# Patient Record
Sex: MALE | Race: WHITE | NOT HISPANIC OR LATINO | Employment: FULL TIME | URBAN - METROPOLITAN AREA
[De-identification: names, ages, dates, MRNs, and addresses within clinical notes are randomized per-mention and may not be internally consistent; named-entity substitution may affect disease eponyms.]

---

## 2021-03-29 ENCOUNTER — IMMUNIZATION (OUTPATIENT)
Dept: PRIMARY CARE CLINIC | Facility: CLINIC | Age: 51
End: 2021-03-29
Payer: COMMERCIAL

## 2021-03-29 DIAGNOSIS — Z23 NEED FOR VACCINATION: Primary | ICD-10-CM

## 2021-03-29 PROCEDURE — 91301 PR SARS-COV-2 COVID-19 VACCINE, NO PRSV, 100MCG/0.5ML, IM: ICD-10-PCS | Mod: S$GLB,,, | Performed by: INTERNAL MEDICINE

## 2021-03-29 PROCEDURE — 91301 PR SARS-COV-2 COVID-19 VACCINE, NO PRSV, 100MCG/0.5ML, IM: CPT | Mod: S$GLB,,, | Performed by: INTERNAL MEDICINE

## 2021-03-29 PROCEDURE — 0011A PR IMMUNIZ ADMIN, SARS-COV-2 COVID-19 VACC, 100MCG/0.5ML, 1ST DOSE: ICD-10-PCS | Mod: CV19,S$GLB,, | Performed by: INTERNAL MEDICINE

## 2021-03-29 PROCEDURE — 0011A PR IMMUNIZ ADMIN, SARS-COV-2 COVID-19 VACC, 100MCG/0.5ML, 1ST DOSE: CPT | Mod: CV19,S$GLB,, | Performed by: INTERNAL MEDICINE

## 2021-03-29 RX ADMIN — Medication 0.5 ML: at 08:03

## 2021-04-28 ENCOUNTER — IMMUNIZATION (OUTPATIENT)
Dept: PRIMARY CARE CLINIC | Facility: CLINIC | Age: 51
End: 2021-04-28
Payer: COMMERCIAL

## 2021-04-28 DIAGNOSIS — Z23 NEED FOR VACCINATION: Primary | ICD-10-CM

## 2021-04-28 PROCEDURE — 0012A PR IMMUNIZ ADMIN, SARS-COV-2 COVID-19 VACC, 100MCG/0.5ML, 2ND DOSE: CPT | Mod: CV19,S$GLB,, | Performed by: INTERNAL MEDICINE

## 2021-04-28 PROCEDURE — 91301 PR SARS-COV-2 COVID-19 VACCINE, NO PRSV, 100MCG/0.5ML, IM: ICD-10-PCS | Mod: S$GLB,,, | Performed by: INTERNAL MEDICINE

## 2021-04-28 PROCEDURE — 0012A PR IMMUNIZ ADMIN, SARS-COV-2 COVID-19 VACC, 100MCG/0.5ML, 2ND DOSE: ICD-10-PCS | Mod: CV19,S$GLB,, | Performed by: INTERNAL MEDICINE

## 2021-04-28 PROCEDURE — 91301 PR SARS-COV-2 COVID-19 VACCINE, NO PRSV, 100MCG/0.5ML, IM: CPT | Mod: S$GLB,,, | Performed by: INTERNAL MEDICINE

## 2021-04-28 RX ADMIN — Medication 0.5 ML: at 08:04

## 2021-12-22 ENCOUNTER — PATIENT MESSAGE (OUTPATIENT)
Dept: ADMINISTRATIVE | Facility: OTHER | Age: 51
End: 2021-12-22
Payer: COMMERCIAL

## 2024-11-12 ENCOUNTER — LAB VISIT (OUTPATIENT)
Dept: LAB | Facility: OTHER | Age: 54
End: 2024-11-12
Attending: STUDENT IN AN ORGANIZED HEALTH CARE EDUCATION/TRAINING PROGRAM
Payer: COMMERCIAL

## 2024-11-12 ENCOUNTER — OFFICE VISIT (OUTPATIENT)
Dept: INTERNAL MEDICINE | Facility: CLINIC | Age: 54
End: 2024-11-12
Payer: COMMERCIAL

## 2024-11-12 VITALS
SYSTOLIC BLOOD PRESSURE: 144 MMHG | OXYGEN SATURATION: 98 % | HEART RATE: 61 BPM | HEIGHT: 69 IN | BODY MASS INDEX: 28.47 KG/M2 | DIASTOLIC BLOOD PRESSURE: 81 MMHG | WEIGHT: 192.25 LBS

## 2024-11-12 DIAGNOSIS — I10 BENIGN ESSENTIAL HTN: Primary | ICD-10-CM

## 2024-11-12 DIAGNOSIS — Z12.11 SCREENING FOR COLON CANCER: ICD-10-CM

## 2024-11-12 DIAGNOSIS — Z12.5 PROSTATE CANCER SCREENING: ICD-10-CM

## 2024-11-12 DIAGNOSIS — Z00.00 ENCOUNTER FOR SCREENING AND PREVENTATIVE CARE: ICD-10-CM

## 2024-11-12 DIAGNOSIS — Z00.00 ANNUAL PHYSICAL EXAM: ICD-10-CM

## 2024-11-12 DIAGNOSIS — I10 BENIGN ESSENTIAL HTN: ICD-10-CM

## 2024-11-12 LAB
ALBUMIN SERPL BCP-MCNC: 4 G/DL (ref 3.5–5.2)
ALP SERPL-CCNC: 63 U/L (ref 40–150)
ALT SERPL W/O P-5'-P-CCNC: 23 U/L (ref 10–44)
ANION GAP SERPL CALC-SCNC: 8 MMOL/L (ref 8–16)
AST SERPL-CCNC: 22 U/L (ref 10–40)
BILIRUB SERPL-MCNC: 0.6 MG/DL (ref 0.1–1)
BUN SERPL-MCNC: 11 MG/DL (ref 6–20)
CALCIUM SERPL-MCNC: 9.8 MG/DL (ref 8.7–10.5)
CHLORIDE SERPL-SCNC: 108 MMOL/L (ref 95–110)
CHOLEST SERPL-MCNC: 178 MG/DL (ref 120–199)
CHOLEST/HDLC SERPL: 3 {RATIO} (ref 2–5)
CO2 SERPL-SCNC: 26 MMOL/L (ref 23–29)
COMPLEXED PSA SERPL-MCNC: 1 NG/ML (ref 0–4)
CREAT SERPL-MCNC: 1.1 MG/DL (ref 0.5–1.4)
ERYTHROCYTE [DISTWIDTH] IN BLOOD BY AUTOMATED COUNT: 14.1 % (ref 11.5–14.5)
EST. GFR  (NO RACE VARIABLE): >60 ML/MIN/1.73 M^2
ESTIMATED AVG GLUCOSE: 100 MG/DL (ref 68–131)
GLUCOSE SERPL-MCNC: 103 MG/DL (ref 70–110)
HBA1C MFR BLD: 5.1 % (ref 4–5.6)
HCT VFR BLD AUTO: 43.4 % (ref 40–54)
HCV AB SERPL QL IA: NEGATIVE
HDLC SERPL-MCNC: 59 MG/DL (ref 40–75)
HDLC SERPL: 33.1 % (ref 20–50)
HGB BLD-MCNC: 14.5 G/DL (ref 14–18)
HIV 1+2 AB+HIV1 P24 AG SERPL QL IA: NEGATIVE
LDLC SERPL CALC-MCNC: 91.8 MG/DL (ref 63–159)
MCH RBC QN AUTO: 29.8 PG (ref 27–31)
MCHC RBC AUTO-ENTMCNC: 33.4 G/DL (ref 32–36)
MCV RBC AUTO: 89 FL (ref 82–98)
NONHDLC SERPL-MCNC: 119 MG/DL
PLATELET # BLD AUTO: 280 K/UL (ref 150–450)
PMV BLD AUTO: 10.5 FL (ref 9.2–12.9)
POTASSIUM SERPL-SCNC: 4.8 MMOL/L (ref 3.5–5.1)
PROT SERPL-MCNC: 6.9 G/DL (ref 6–8.4)
RBC # BLD AUTO: 4.87 M/UL (ref 4.6–6.2)
SODIUM SERPL-SCNC: 142 MMOL/L (ref 136–145)
TRIGL SERPL-MCNC: 136 MG/DL (ref 30–150)
WBC # BLD AUTO: 7.56 K/UL (ref 3.9–12.7)

## 2024-11-12 PROCEDURE — 83036 HEMOGLOBIN GLYCOSYLATED A1C: CPT | Performed by: STUDENT IN AN ORGANIZED HEALTH CARE EDUCATION/TRAINING PROGRAM

## 2024-11-12 PROCEDURE — 4010F ACE/ARB THERAPY RXD/TAKEN: CPT | Mod: CPTII,S$GLB,, | Performed by: STUDENT IN AN ORGANIZED HEALTH CARE EDUCATION/TRAINING PROGRAM

## 2024-11-12 PROCEDURE — 86803 HEPATITIS C AB TEST: CPT | Performed by: STUDENT IN AN ORGANIZED HEALTH CARE EDUCATION/TRAINING PROGRAM

## 2024-11-12 PROCEDURE — 99386 PREV VISIT NEW AGE 40-64: CPT | Mod: S$GLB,,, | Performed by: STUDENT IN AN ORGANIZED HEALTH CARE EDUCATION/TRAINING PROGRAM

## 2024-11-12 PROCEDURE — 80061 LIPID PANEL: CPT | Performed by: STUDENT IN AN ORGANIZED HEALTH CARE EDUCATION/TRAINING PROGRAM

## 2024-11-12 PROCEDURE — 3077F SYST BP >= 140 MM HG: CPT | Mod: CPTII,S$GLB,, | Performed by: STUDENT IN AN ORGANIZED HEALTH CARE EDUCATION/TRAINING PROGRAM

## 2024-11-12 PROCEDURE — 80053 COMPREHEN METABOLIC PANEL: CPT | Performed by: STUDENT IN AN ORGANIZED HEALTH CARE EDUCATION/TRAINING PROGRAM

## 2024-11-12 PROCEDURE — 84153 ASSAY OF PSA TOTAL: CPT | Performed by: STUDENT IN AN ORGANIZED HEALTH CARE EDUCATION/TRAINING PROGRAM

## 2024-11-12 PROCEDURE — 1159F MED LIST DOCD IN RCRD: CPT | Mod: CPTII,S$GLB,, | Performed by: STUDENT IN AN ORGANIZED HEALTH CARE EDUCATION/TRAINING PROGRAM

## 2024-11-12 PROCEDURE — 3008F BODY MASS INDEX DOCD: CPT | Mod: CPTII,S$GLB,, | Performed by: STUDENT IN AN ORGANIZED HEALTH CARE EDUCATION/TRAINING PROGRAM

## 2024-11-12 PROCEDURE — 85027 COMPLETE CBC AUTOMATED: CPT | Performed by: STUDENT IN AN ORGANIZED HEALTH CARE EDUCATION/TRAINING PROGRAM

## 2024-11-12 PROCEDURE — 36415 COLL VENOUS BLD VENIPUNCTURE: CPT | Performed by: STUDENT IN AN ORGANIZED HEALTH CARE EDUCATION/TRAINING PROGRAM

## 2024-11-12 PROCEDURE — 87389 HIV-1 AG W/HIV-1&-2 AB AG IA: CPT | Performed by: STUDENT IN AN ORGANIZED HEALTH CARE EDUCATION/TRAINING PROGRAM

## 2024-11-12 PROCEDURE — 99999 PR PBB SHADOW E&M-EST. PATIENT-LVL III: CPT | Mod: PBBFAC,,, | Performed by: STUDENT IN AN ORGANIZED HEALTH CARE EDUCATION/TRAINING PROGRAM

## 2024-11-12 PROCEDURE — 3044F HG A1C LEVEL LT 7.0%: CPT | Mod: CPTII,S$GLB,, | Performed by: STUDENT IN AN ORGANIZED HEALTH CARE EDUCATION/TRAINING PROGRAM

## 2024-11-12 PROCEDURE — 3079F DIAST BP 80-89 MM HG: CPT | Mod: CPTII,S$GLB,, | Performed by: STUDENT IN AN ORGANIZED HEALTH CARE EDUCATION/TRAINING PROGRAM

## 2024-11-12 RX ORDER — FINASTERIDE 1 MG/1
1 TABLET, FILM COATED ORAL
COMMUNITY
Start: 2024-10-18

## 2024-11-12 RX ORDER — LOSARTAN POTASSIUM 25 MG/1
TABLET ORAL
COMMUNITY
Start: 2023-11-11 | End: 2024-11-12 | Stop reason: SDUPTHER

## 2024-11-12 RX ORDER — MINOXIDIL 2.5 MG/1
2.5 TABLET ORAL
COMMUNITY
Start: 2024-10-18

## 2024-11-12 RX ORDER — LOSARTAN POTASSIUM 25 MG/1
25 TABLET ORAL DAILY
Qty: 90 TABLET | Refills: 3 | Status: SHIPPED | OUTPATIENT
Start: 2024-11-12 | End: 2025-11-07

## 2024-11-12 NOTE — PROGRESS NOTES
[facility]  Subjective:     Patient ID: Ricardo Guo is a 54 y.o. male.  History of Present Illness    CHIEF COMPLAINT:  Patient presents to establish care with a new PCP and obtain a refill for his high blood pressure medication.    HPI:  Patient, a 54-year-old male, seeks to establish care with a new PCP after previously a PCP on the [facility]. He ran out of his high blood pressure medication yesterday and needs a refill. He is compliant with losartan 25 mg daily, reporting his blood pressure usually runs 120/80 when medicated. Patient does not routinely measure his blood pressure at home, only doing so on rare occasions when he has mild discomfort.    He exercises regularly, running 3 days a week and lifting weights 3 days a week, totaling 6 days of exercise per week. He works as a , predominantly a desk job. Patient reports occasional alcohol use, consuming approximately 2 drinks per week on weekends. He is currently sexually active with a female partner.    Patient mentions having right knee surgery 5 years ago but does not report ongoing knee issues or symptoms. He denies pain, discomfort, shortness of breath, smoking, and drug use.    MEDICATIONS:  Patient is on Losartan 25 mg, taking 1 tablet daily for high blood pressure. He is also on Finasteride and Minoxidil.    MEDICAL HISTORY:  Patient has a history of hypertension.    FAMILY HISTORY:  Family history is significant for mother with a history of stomach cancer. His father has a history of prostate cancer, and his brother has a history of high blood pressure.    SURGICAL HISTORY:  Patient underwent right knee surgery 5 years ago.    TEST RESULTS:  Patient had a cholesterol panel last year, which was slightly elevated but within acceptable range. His complete blood count, kidney function tests, liver function tests, and PSA were all normal last year.    ALLERGIES:  Patient reports no allergies.    SOCIAL HISTORY:  Patient  "consumes alcohol occasionally, roughly 2 drinks a week on weekends. He is a nonsmoker and denies any drug use. Patient works as a , specializing in insurance defense. He is single.       Current Outpatient Medications   Medication Instructions    finasteride (PROPECIA) 1 mg    losartan (COZAAR) 25 mg, Oral, Daily    minoxidiL (LONITEN) 2.5 mg     Objective:      Body mass index is 28.39 kg/m².  Vitals:    11/12/24 0842   BP: (!) 144/81   Pulse: 61   SpO2: 98%   Weight: 87.2 kg (192 lb 3.9 oz)   Height: 5' 9" (1.753 m)   PainSc: 0-No pain     Physical Exam   Physical Exam    General: No acute distress. Normal appearance.  Head: Normocephalic.  Eyes: Extraocular movements intact.  Cardiovascular: Normal rate and rhythm. No murmur heard.  Lungs: Pulmonary effort is normal. Normal breath sounds. No wheezing. No rales.  Abdomen: Flat.  Musculoskeletal: No edema lower extremities.  Skin: Warm. Dry.  Neurological: Alert.  Psychiatric: Normal mood. Normal behavior.  Vitals: Blood pressure: 144/81. Blood pressure: 137/80.       Assessment:       1. Benign essential HTN    2. Screening for colon cancer    3. Encounter for screening and preventative care    4. Prostate cancer screening        Plan:   Assessment & Plan    PLAN SUMMARY:  Continue losartan 25mg daily for hypertension  Cologuard test ordered for colon cancer screening  Shingles vaccine to be administered at pharmacy  Routine labs ordered including HIV, hepatitis C, A1C, lipid panel, PSA, kidney and liver function tests  Follow up in 1 year if doing well  Check portal for routine result messages    HYPERTENSION:  Assessed hypertension control with low-dose losartan 25mg daily.  Continued losartan 25mg daily for hypertension.    COLON CANCER SCREENING:  Evaluated need for routine health screenings including colon cancer, prostate cancer, and vaccinations.  Considered patient's preference for non-invasive colon cancer screening.  Explained Cologuard as a " non-invasive alternative to colonoscopy for colon cancer screening.  Cologuard test ordered for colon cancer screening.    PROSTATE CANCER SCREENING:  Discussed risks and benefits of PSA testing given family history of prostate cancer.  Discussed the pros and cons of PSA testing for prostate cancer screening.    HYPERLIPIDEMIA:  Reviewed recent lab results, noting slightly elevated cholesterol but otherwise normal values.    SHINGLES VACCINATION:  Provided information on shingles vaccine, including potential side effects and its effectiveness.  Patient to get shingles vaccine at pharmacy (2 doses, 2 months apart).    LABS:  Routine labs ordered including HIV screening, hepatitis C screening, A1C, lipid panel, PSA, kidney and liver function tests.    FOLLOW UP:  Follow up in 1 year if doing well.  Contact the office if anything concerning arises before next scheduled visit.  Check portal for routine result messages.         Benign essential HTN  -     losartan (COZAAR) 25 MG tablet; Take 1 tablet (25 mg total) by mouth once daily.  Dispense: 90 tablet; Refill: 3  -     COMPREHENSIVE METABOLIC PANEL; Future; Expected date: 11/12/2024  -     CBC Without Differential; Future; Expected date: 11/12/2024    Screening for colon cancer  -     Cologuard Screening (Multitarget Stool DNA); Future; Expected date: 11/12/2024    Encounter for screening and preventative care  -     Hepatitis C antibody; Future; Expected date: 11/12/2024  -     HIV 1/2 Ag/Ab (4th Gen); Future; Expected date: 11/12/2024  -     Hemoglobin A1c; Future; Expected date: 11/12/2024    Prostate cancer screening  -     PSA, SCREENING; Future; Expected date: 11/12/2024        Tests to Keep You Healthy    Colon Cancer Screening: ORDERED  Last Blood Pressure <= 139/89 (11/12/2024): NO    Follow up in about 1 year (around 11/12/2025). or sooner prn (as needed)          Eitan Menjivar  [facility]  [address] [place] [address]  Phone [phone]  Fax [phone]    Part of this note is dictated using the [employer] Flu[employer] Direct word recognition program. It may contain word recognition mistakes or wrong word substitutions (commonly he/she and is/was substitutions) that were missed on review.    Part of this note was generated with the assistance of ambient listening technology. Verbal consent was obtained by the patient and accompanying visitor(s) for the recording of patient appointment to facilitate this note. I attest to having reviewed and edited the generated note for accuracy, though some syntax or spelling errors may persist. Please contact the author of this note for any clarification.

## 2024-12-18 ENCOUNTER — PATIENT OUTREACH (OUTPATIENT)
Dept: ADMINISTRATIVE | Facility: HOSPITAL | Age: 54
End: 2024-12-18
Payer: COMMERCIAL

## 2025-06-30 ENCOUNTER — TELEPHONE (OUTPATIENT)
Dept: INTERNAL MEDICINE | Facility: CLINIC | Age: 55
End: 2025-06-30
Payer: COMMERCIAL

## 2025-06-30 DIAGNOSIS — I10 BENIGN ESSENTIAL HTN: Primary | ICD-10-CM

## 2025-08-27 DIAGNOSIS — I10 BENIGN ESSENTIAL HTN: ICD-10-CM

## 2025-08-27 RX ORDER — LOSARTAN POTASSIUM 25 MG/1
25 TABLET ORAL DAILY
Qty: 90 TABLET | Refills: 3 | Status: SHIPPED | OUTPATIENT
Start: 2025-08-27 | End: 2026-08-22